# Patient Record
Sex: FEMALE | Race: WHITE
[De-identification: names, ages, dates, MRNs, and addresses within clinical notes are randomized per-mention and may not be internally consistent; named-entity substitution may affect disease eponyms.]

---

## 2019-09-09 ENCOUNTER — HOSPITAL ENCOUNTER (EMERGENCY)
Dept: HOSPITAL 4 - SED | Age: 49
Discharge: HOME | End: 2019-09-09
Payer: MEDICAID

## 2019-09-09 VITALS — HEIGHT: 62 IN | BODY MASS INDEX: 29.63 KG/M2 | WEIGHT: 161 LBS

## 2019-09-09 VITALS — SYSTOLIC BLOOD PRESSURE: 141 MMHG

## 2019-09-09 VITALS — SYSTOLIC BLOOD PRESSURE: 180 MMHG

## 2019-09-09 DIAGNOSIS — I10: ICD-10-CM

## 2019-09-09 DIAGNOSIS — R10.12: Primary | ICD-10-CM

## 2019-09-09 DIAGNOSIS — Z88.1: ICD-10-CM

## 2019-09-09 LAB
ALBUMIN SERPL BCP-MCNC: 3.9 G/DL (ref 3.4–4.8)
ALT SERPL W P-5'-P-CCNC: 45 U/L (ref 12–78)
ANION GAP SERPL CALCULATED.3IONS-SCNC: 7 MMOL/L (ref 5–15)
AST SERPL W P-5'-P-CCNC: 23 U/L (ref 10–37)
BASOPHILS # BLD AUTO: 0.1 K/UL (ref 0–0.2)
BASOPHILS NFR BLD AUTO: 0.9 % (ref 0–2)
BILIRUB SERPL-MCNC: 0.6 MG/DL (ref 0–1)
BUN SERPL-MCNC: 8 MG/DL (ref 8–21)
CALCIUM SERPL-MCNC: 9.5 MG/DL (ref 8.4–11)
CHLORIDE SERPL-SCNC: 107 MMOL/L (ref 98–107)
CREAT SERPL-MCNC: 0.7 MG/DL (ref 0.55–1.3)
EOSINOPHIL # BLD AUTO: 0.2 K/UL (ref 0–0.4)
EOSINOPHIL NFR BLD AUTO: 2.5 % (ref 0–4)
ERYTHROCYTE [DISTWIDTH] IN BLOOD BY AUTOMATED COUNT: 12.7 % (ref 9–15)
GFR SERPL CREATININE-BSD FRML MDRD: 116 ML/MIN (ref 90–?)
GLUCOSE SERPL-MCNC: 85 MG/DL (ref 70–99)
HCT VFR BLD AUTO: 42.7 % (ref 36–48)
HGB BLD-MCNC: 14.7 G/DL (ref 12–16)
LIPASE SERPL-CCNC: 115 U/L (ref 73–393)
LYMPHOCYTES # BLD AUTO: 2.5 K/UL (ref 1–5.5)
LYMPHOCYTES NFR BLD AUTO: 29.8 % (ref 20.5–51.5)
MCH RBC QN AUTO: 32 PG (ref 27–31)
MCHC RBC AUTO-ENTMCNC: 35 % (ref 32–36)
MCV RBC AUTO: 93 FL (ref 79–98)
MONOCYTES # BLD MANUAL: 0.8 K/UL (ref 0–1)
MONOCYTES # BLD MANUAL: 10 % (ref 1.7–9.3)
NEUTROPHILS # BLD AUTO: 4.8 K/UL (ref 1.8–7.7)
NEUTROPHILS NFR BLD AUTO: 56.8 % (ref 40–70)
PLATELET # BLD AUTO: 263 K/UL (ref 130–430)
POTASSIUM SERPL-SCNC: 3.8 MMOL/L (ref 3.5–5.1)
RBC # BLD AUTO: 4.6 MIL/UL (ref 4.2–6.2)
SODIUM SERPLBLD-SCNC: 140 MMOL/L (ref 136–145)
WBC # BLD AUTO: 8.4 K/UL (ref 4.8–10.8)

## 2019-09-09 PROCEDURE — 93005 ELECTROCARDIOGRAM TRACING: CPT

## 2019-09-09 PROCEDURE — 85025 COMPLETE CBC W/AUTO DIFF WBC: CPT

## 2019-09-09 PROCEDURE — 74018 RADEX ABDOMEN 1 VIEW: CPT

## 2019-09-09 PROCEDURE — 99284 EMERGENCY DEPT VISIT MOD MDM: CPT

## 2019-09-09 PROCEDURE — 36415 COLL VENOUS BLD VENIPUNCTURE: CPT

## 2019-09-09 PROCEDURE — 80053 COMPREHEN METABOLIC PANEL: CPT

## 2019-09-09 PROCEDURE — 83690 ASSAY OF LIPASE: CPT

## 2019-09-09 PROCEDURE — 96372 THER/PROPH/DIAG INJ SC/IM: CPT

## 2019-09-09 NOTE — NUR
Patient given written and verbal discharge instructions and verbalizes 
understanding.  ER MD discussed with patient the results and treatment 
provided. Patient in stable condition. ID arm band removed

Rx of tramadol given. Patient educated on pain management and to follow up with 
PMD. Pain Scale 0/10.

Opportunity for questions provided and answered. Medication side effect fact 
sheet provided.

## 2019-09-09 NOTE — NUR
Patient is awake, alert, and oriented x4.  Patient states she drank 6 sodas on 
Thursday and has had left flank pain x4 days and hypertension.  Patient denies 
painful urination or hematuria.

## 2022-06-02 ENCOUNTER — HOSPITAL ENCOUNTER (EMERGENCY)
Dept: HOSPITAL 4 - SED | Age: 52
Discharge: HOME | End: 2022-06-02
Payer: COMMERCIAL

## 2022-06-02 VITALS — WEIGHT: 168 LBS | BODY MASS INDEX: 30.91 KG/M2 | HEIGHT: 62 IN

## 2022-06-02 VITALS — SYSTOLIC BLOOD PRESSURE: 150 MMHG

## 2022-06-02 DIAGNOSIS — M54.12: Primary | ICD-10-CM

## 2022-06-02 PROCEDURE — 99283 EMERGENCY DEPT VISIT LOW MDM: CPT

## 2022-06-02 PROCEDURE — 96372 THER/PROPH/DIAG INJ SC/IM: CPT

## 2022-06-02 NOTE — NUR
Patient given written and verbal discharge instructions and verbalizes 
understanding.  ER MD discussed with patient the results and treatment 
provided. Patient in stable condition. ID arm band removed. 

Rx of IBUPROFEN given. Patient educated on pain management and to follow up 
with PMD. Pain Scale 0/10.

Opportunity for questions provided and answered. Medication side effect fact 
sheet provided.

## 2022-06-02 NOTE — NUR
52yo f with c/o worsening left neck pain, left elbow pain and left arm numbness 
x 3 weeks. pt denies trauma or injury to area but states that she works in 
[employer] and does a lot of lifting. Pt takes muscle relaxant, last taken 
4 days ago, which provides temporary relief.